# Patient Record
Sex: MALE | Race: WHITE | Employment: PART TIME | URBAN - METROPOLITAN AREA
[De-identification: names, ages, dates, MRNs, and addresses within clinical notes are randomized per-mention and may not be internally consistent; named-entity substitution may affect disease eponyms.]

---

## 2021-12-20 ENCOUNTER — HOSPITAL ENCOUNTER (OUTPATIENT)
Age: 76
Discharge: HOME | End: 2021-12-20
Payer: MEDICARE

## 2021-12-20 VITALS
WEIGHT: 257 LBS | HEART RATE: 61 BPM | TEMPERATURE: 98 F | HEIGHT: 73 IN | SYSTOLIC BLOOD PRESSURE: 144 MMHG | DIASTOLIC BLOOD PRESSURE: 75 MMHG | BODY MASS INDEX: 34.06 KG/M2 | RESPIRATION RATE: 16 BRPM | OXYGEN SATURATION: 99 %

## 2021-12-20 DIAGNOSIS — Z20.822 CLOSE EXPOSURE TO 2019 NOVEL CORONAVIRUS: Primary | ICD-10-CM

## 2021-12-20 PROCEDURE — 99202 OFFICE O/P NEW SF 15 MIN: CPT

## 2021-12-20 PROCEDURE — 99203 OFFICE O/P NEW LOW 30 MIN: CPT

## 2021-12-20 NOTE — ED PROVIDER NOTES
Patient Seen in: [facility]      History   Patient presents with:  Covid-19 Test    Stated Complaint: covid test/exposed    Subjective:   HPI    Raad Cisneros is a 72-year-old male who presents today for Covid testing.   He states that he was do complete sentences, without difficulty. Lungs are clear to auscultation. No wheezes, rales or rhonchi appreciated. Musculoskeletal: Normal range of motion. No edema or tenderness.    Neurological: CN III - XII grossly intact, normal strength and sensat

## 2021-12-20 NOTE — ED INITIAL ASSESSMENT (HPI)
Pt exposed to daughter Saturday she tested +covid. this morning. Pt denies pt any symptoms. Pt is fully vaccinated and had booster.